# Patient Record
Sex: MALE | Race: BLACK OR AFRICAN AMERICAN
[De-identification: names, ages, dates, MRNs, and addresses within clinical notes are randomized per-mention and may not be internally consistent; named-entity substitution may affect disease eponyms.]

---

## 2017-02-14 ENCOUNTER — HOSPITAL ENCOUNTER (EMERGENCY)
Dept: HOSPITAL 62 - ER | Age: 57
Discharge: HOME | End: 2017-02-14
Payer: MEDICAID

## 2017-02-14 VITALS — SYSTOLIC BLOOD PRESSURE: 141 MMHG | DIASTOLIC BLOOD PRESSURE: 93 MMHG

## 2017-02-14 DIAGNOSIS — R11.2: Primary | ICD-10-CM

## 2017-02-14 DIAGNOSIS — R07.9: ICD-10-CM

## 2017-02-14 DIAGNOSIS — I10: ICD-10-CM

## 2017-02-14 DIAGNOSIS — R10.30: ICD-10-CM

## 2017-02-14 DIAGNOSIS — F17.200: ICD-10-CM

## 2017-02-14 DIAGNOSIS — R19.7: ICD-10-CM

## 2017-02-14 DIAGNOSIS — R05: ICD-10-CM

## 2017-02-14 DIAGNOSIS — R79.89: ICD-10-CM

## 2017-02-14 LAB
ALBUMIN SERPL-MCNC: 3.4 G/DL (ref 3.5–5)
ALP SERPL-CCNC: 94 U/L (ref 38–126)
ALT SERPL-CCNC: 121 U/L (ref 21–72)
ANION GAP SERPL CALC-SCNC: 8 MMOL/L (ref 5–19)
AST SERPL-CCNC: 99 U/L (ref 17–59)
BASOPHILS # BLD AUTO: 0 10^3/UL (ref 0–0.2)
BASOPHILS NFR BLD AUTO: 0.3 % (ref 0–2)
BILIRUB DIRECT SERPL-MCNC: 0 MG/DL (ref 0–0.3)
BILIRUB SERPL-MCNC: 0.8 MG/DL (ref 0.2–1.3)
BUN SERPL-MCNC: 15 MG/DL (ref 7–20)
CALCIUM: 9.3 MG/DL (ref 8.4–10.2)
CHLORIDE SERPL-SCNC: 104 MMOL/L (ref 98–107)
CK MB SERPL-MCNC: 8.41 NG/ML (ref ?–4.55)
CK SERPL-CCNC: 855 U/L (ref 55–170)
CO2 SERPL-SCNC: 28 MMOL/L (ref 22–30)
CREAT SERPL-MCNC: 0.81 MG/DL (ref 0.52–1.25)
EOSINOPHIL # BLD AUTO: 0 10^3/UL (ref 0–0.6)
EOSINOPHIL NFR BLD AUTO: 0.9 % (ref 0–6)
ERYTHROCYTE [DISTWIDTH] IN BLOOD BY AUTOMATED COUNT: 15.5 % (ref 11.5–14)
GLUCOSE SERPL-MCNC: 81 MG/DL (ref 75–110)
HCT VFR BLD CALC: 41.4 % (ref 37.9–51)
HGB BLD-MCNC: 13.8 G/DL (ref 13.5–17)
HGB HCT DIFFERENCE: 0
LIPASE SERPL-CCNC: 436.8 U/L (ref 23–300)
LYMPHOCYTES # BLD AUTO: 0.8 10^3/UL (ref 0.5–4.7)
LYMPHOCYTES NFR BLD AUTO: 22.1 % (ref 13–45)
MCH RBC QN AUTO: 29.4 PG (ref 27–33.4)
MCHC RBC AUTO-ENTMCNC: 33.3 G/DL (ref 32–36)
MCV RBC AUTO: 88 FL (ref 80–97)
MONOCYTES # BLD AUTO: 0.6 10^3/UL (ref 0.1–1.4)
MONOCYTES NFR BLD AUTO: 14.9 % (ref 3–13)
NEUTROPHILS # BLD AUTO: 2.4 10^3/UL (ref 1.7–8.2)
NEUTS SEG NFR BLD AUTO: 61.8 % (ref 42–78)
POTASSIUM SERPL-SCNC: 4.2 MMOL/L (ref 3.6–5)
PROT SERPL-MCNC: 7.4 G/DL (ref 6.3–8.2)
RBC # BLD AUTO: 4.69 10^6/UL (ref 4.35–5.55)
SODIUM SERPL-SCNC: 140.2 MMOL/L (ref 137–145)
TROPONIN I SERPL-MCNC: < 0.012 NG/ML
WBC # BLD AUTO: 3.8 10^3/UL (ref 4–10.5)

## 2017-02-14 PROCEDURE — 94640 AIRWAY INHALATION TREATMENT: CPT

## 2017-02-14 PROCEDURE — 85025 COMPLETE CBC W/AUTO DIFF WBC: CPT

## 2017-02-14 PROCEDURE — 82553 CREATINE MB FRACTION: CPT

## 2017-02-14 PROCEDURE — 87804 INFLUENZA ASSAY W/OPTIC: CPT

## 2017-02-14 PROCEDURE — 93005 ELECTROCARDIOGRAM TRACING: CPT

## 2017-02-14 PROCEDURE — 96361 HYDRATE IV INFUSION ADD-ON: CPT

## 2017-02-14 PROCEDURE — 76700 US EXAM ABDOM COMPLETE: CPT

## 2017-02-14 PROCEDURE — 36415 COLL VENOUS BLD VENIPUNCTURE: CPT

## 2017-02-14 PROCEDURE — 99285 EMERGENCY DEPT VISIT HI MDM: CPT

## 2017-02-14 PROCEDURE — 96374 THER/PROPH/DIAG INJ IV PUSH: CPT

## 2017-02-14 PROCEDURE — 83690 ASSAY OF LIPASE: CPT

## 2017-02-14 PROCEDURE — 93010 ELECTROCARDIOGRAM REPORT: CPT

## 2017-02-14 PROCEDURE — 82550 ASSAY OF CK (CPK): CPT

## 2017-02-14 PROCEDURE — 74022 RADEX COMPL AQT ABD SERIES: CPT

## 2017-02-14 PROCEDURE — 80053 COMPREHEN METABOLIC PANEL: CPT

## 2017-02-14 PROCEDURE — 84484 ASSAY OF TROPONIN QUANT: CPT

## 2017-02-14 NOTE — ER DOCUMENT REPORT
ED General





- General


Chief Complaint: Productive Cough


Stated Complaint: CHEST PAIN


Time seen by provider: 11:44


Mode of Arrival: Medic


Information source: Patient


Notes: 


56-year-old male who complains of 3 week history of intermittent nausea 

vomiting and diarrhea along with 4 day history of nonproductive cough 

subjective fever and sharp right parasternal chest pain that occurs in 

association with nausea goes away after about 5 minutes.  He reports chest 

discomfort is nonradiating.  He denies any type of chest tightness or 

heaviness.  He denies hematemesis, hematochezia, or melena.  He denies dysuria.

  He reports intermittent bilateral lower abdominal cramping which is not 

bothering him at the moment.  He denies earache, sore throat, back pain, or 

syncope.


Physical Exam:





General: Alert, appears well. 





HEENT: Normocephalic. Atraumatic. PERRLA. Extraocular movements intact. 

Oropharynx clear.





Neck: Supple. Non-tender.





Respiratory: No respiratory distress. Clear and equal breath sounds bilaterally.





Cardiovascular: Regular rate and rhythm. 





Abdominal: Normal Inspection. Soft, non-tender. No distension. Normal Bowel 

Sounds. 





Back: Non-tender. No deformity or step off.





All extremities warm with 2+ pulses no cyanosis no edema no Homans sign





Speech clear mentation normal  strength 5 out of 5 equal both upper 

extremities


Psychological: Normal affect. Normal Mood. 





Skin: Warm. Dry. Normal color.





Past Medical History





- Social History


Smoking Status: Current Every Day Smoker


Family History: None





- Past Medical History


Cardiac Medical History: Reports: Hx Hypertension


Neurological Medical History: Reports: Hx Cerebrovascular Accident - Residual 

left lower extremity weakness


Past Surgical History: Reports: Other - Back surgery





Review of Systems





- Review of Systems


Constitutional: See HPI


EENT: denies: Ear pain, Throat pain


Cardiovascular: See HPI


Respiratory: See HPI


Gastrointestinal: See HPI


Genitourinary: See HPI


Musculoskeletal: denies: Back pain


Skin: denies: Rash


Neurological/Psychological: denies: Speech impairment, Numbness





Physical Exam





- Vital signs


Vitals: 


 











Pulse Ox


 


 95 


 


 02/14/17 11:29














Course





- Re-evaluation


Re-evalutation: 


02/14/17 17:15


Patient has mild elevation of LFTs and lipase.  Patient has no discrete 

tenderness abdomen and no guarding rebound or rigidity reexamination.  I 

believe his symptoms are most consistent with a gastritis and I doubt patient 

has any pancreatitis sufficient to warrant admission or nothing by mouth 

status.  He'll be discharged with proton pump inhibitor, Carafate, and 

medication for nausea and instructed to follow with his physician [facility] next week for recheck.  Patient's presentation is not consistent with a 

cardiac etiology for his chest discomfort





02/14/17 18:01








- Vital Signs


Vital signs: 


 











Temp Pulse Resp BP Pulse Ox


 


             95 


 


             02/14/17 11:29








02/14/17 17:15








02/14/17 18:01








- Laboratory


Result Diagrams: 


 02/14/17 12:10





 02/14/17 12:10


Laboratory results interpreted by me: 


 











  02/14/17 02/14/17 02/14/17





  12:10 12:10 12:10


 


WBC  3.8 L  


 


RDW  15.5 H  


 


Plt Count  104 L  


 


Monocytes %  14.9 H  


 


AST   99 H 


 


ALT   121 H 


 


Creatine Kinase   855 H 


 


CK-MB (CK-2)    8.41 H


 


Albumin   3.4 L 


 


Lipase   436.8 H 














- Diagnostic Test


Radiology reviewed: Image reviewed, Reports reviewed





- EKG Interpretation by Me


Additional EKG results interpreted by me: 


02/14/17 11:47


EKG reviewed by myself shows sinus rhythm at 56 no acute changes





02/14/17 18:00


Repeat EKG reviewed, so signs rhythm 54 and changes





Discharge





- Discharge


Clinical Impression: 


Abdominal pain


Qualifiers:


 Abdominal location: unspecified location Qualified Code(s): R10.9 - 

Unspecified abdominal pain





Nausea & vomiting


Qualifiers:


 Vomiting type: unspecified Vomiting Intractability: non-intractable Qualified 

Code(s): R11.2 - Nausea with vomiting, unspecified





Condition: Stable


Disposition: HOME, SELF-CARE


Instructions:  Abdominal Pain ([facility])


Additional Instructions: 


Nausea or Vomiting, Nonspecific





     Vomiting (or nausea without vomiting) can be caused by many different 

problems. Of course, it can mean that something's wrong with the stomach, such 

as "stomach flu," ulcers, or inflammation. But it can also be a symptom of a 

problem that has nothing to do with the stomach or intestines. Vomiting is 

common with severe headaches, earaches, and tonsillitis. We see it with 

pneumonia or heart attacks. Drugs can cause nausea. Many abdominal problems 

cause vomiting; for example, gallstones, kidney stones, pancreatitis, and 

intestinal obstruction (blocked bowels).


     In most cases, curing the vomiting depends on fixing the problem that 

caused it. For temporary relief, we may use an anti-nausea medicine. For home 

use, we can prescribe suppositories, chewable pills, pills that dissolve in the 

mouth, or liquid anti-nausea drugs. If the vomiting seems to be caused by a 

problem in the stomach, acid-suppressing drugs may be prescribed as well.


     It's important to avoid dehydration. Sip clear liquids. Take increasing 

amounts of fluid over the first 24 hours. Then start small amounts of bland 

foods (such as dry toast, applesauce, mashed potato). Avoid aspirin, tobacco, 

and alcohol. Gradually resume your usual diet.


     If the vomiting worsens, if the problem that's making you vomit worsens, 

or if there's evidence of bleeding in the stomach (such as black, tarry stool, 

bloody or black vomit, or lightheadedness), you should return immediately. Call 

your doctor if you aren't improved in 24 to 36 hours.


Take Prevacid or Prilosec twice a day


Prescriptions: 


Promethazine HCl [Phenergan 25 mg Tablet] 1 tab PO Q6H PRN #20 tablet


 PRN Reason: 


Sucralfate [Carafate Susp 1 Gm/10 Ml Udcup] 1 gm PO QID #20 udc


Referrals: 


[facility] [Provider Group] - Follow up in 1 week

## 2017-12-06 ENCOUNTER — HOSPITAL ENCOUNTER (EMERGENCY)
Dept: HOSPITAL 62 - ER | Age: 57
Discharge: HOME | End: 2017-12-06
Payer: MEDICAID

## 2017-12-06 VITALS — DIASTOLIC BLOOD PRESSURE: 82 MMHG | SYSTOLIC BLOOD PRESSURE: 127 MMHG

## 2017-12-06 DIAGNOSIS — R50.9: ICD-10-CM

## 2017-12-06 DIAGNOSIS — I69.944: ICD-10-CM

## 2017-12-06 DIAGNOSIS — J18.1: Primary | ICD-10-CM

## 2017-12-06 DIAGNOSIS — I10: ICD-10-CM

## 2017-12-06 DIAGNOSIS — F17.210: ICD-10-CM

## 2017-12-06 LAB
ALBUMIN SERPL-MCNC: 3.8 G/DL (ref 3.5–5)
ALP SERPL-CCNC: 108 U/L (ref 38–126)
ALT SERPL-CCNC: 37 U/L (ref 21–72)
ANION GAP SERPL CALC-SCNC: 12 MMOL/L (ref 5–19)
APPEARANCE UR: CLEAR
AST SERPL-CCNC: 39 U/L (ref 17–59)
BASOPHILS # BLD AUTO: 0 10^3/UL (ref 0–0.2)
BASOPHILS NFR BLD AUTO: 0.2 % (ref 0–2)
BILIRUB DIRECT SERPL-MCNC: 0.6 MG/DL (ref 0–0.4)
BILIRUB SERPL-MCNC: 1 MG/DL (ref 0.2–1.3)
BILIRUB UR QL STRIP: NEGATIVE
BUN SERPL-MCNC: 7 MG/DL (ref 7–20)
CALCIUM: 9.4 MG/DL (ref 8.4–10.2)
CHLORIDE SERPL-SCNC: 92 MMOL/L (ref 98–107)
CO2 SERPL-SCNC: 28 MMOL/L (ref 22–30)
CREAT SERPL-MCNC: 0.74 MG/DL (ref 0.52–1.25)
EOSINOPHIL # BLD AUTO: 0 10^3/UL (ref 0–0.6)
EOSINOPHIL NFR BLD AUTO: 0 % (ref 0–6)
ERYTHROCYTE [DISTWIDTH] IN BLOOD BY AUTOMATED COUNT: 14.1 % (ref 11.5–14)
GLUCOSE SERPL-MCNC: 98 MG/DL (ref 75–110)
GLUCOSE UR STRIP-MCNC: NEGATIVE MG/DL
HCT VFR BLD CALC: 39.6 % (ref 37.9–51)
HGB BLD-MCNC: 13.3 G/DL (ref 13.5–17)
HGB HCT DIFFERENCE: 0.3
KETONES UR STRIP-MCNC: NEGATIVE MG/DL
LYMPHOCYTES # BLD AUTO: 1.6 10^3/UL (ref 0.5–4.7)
LYMPHOCYTES NFR BLD AUTO: 8.3 % (ref 13–45)
MAGNESIUM SERPL-MCNC: 1.8 MG/DL (ref 1.6–2.3)
MCH RBC QN AUTO: 30.6 PG (ref 27–33.4)
MCHC RBC AUTO-ENTMCNC: 33.6 G/DL (ref 32–36)
MCV RBC AUTO: 91 FL (ref 80–97)
MONOCYTES # BLD AUTO: 2.8 10^3/UL (ref 0.1–1.4)
MONOCYTES NFR BLD AUTO: 14.3 % (ref 3–13)
NEUTROPHILS # BLD AUTO: 15.1 10^3/UL (ref 1.7–8.2)
NEUTS SEG NFR BLD AUTO: 77.2 % (ref 42–78)
NITRITE UR QL STRIP: NEGATIVE
PH UR STRIP: 5 [PH] (ref 5–9)
POTASSIUM SERPL-SCNC: 3.1 MMOL/L (ref 3.6–5)
PROT SERPL-MCNC: 7.6 G/DL (ref 6.3–8.2)
PROT UR STRIP-MCNC: NEGATIVE MG/DL
RBC # BLD AUTO: 4.35 10^6/UL (ref 4.35–5.55)
SODIUM SERPL-SCNC: 132.2 MMOL/L (ref 137–145)
SP GR UR STRIP: 1.01
UROBILINOGEN UR-MCNC: 4 MG/DL (ref ?–2)
WBC # BLD AUTO: 19.6 10^3/UL (ref 4–10.5)

## 2017-12-06 PROCEDURE — 87205 SMEAR GRAM STAIN: CPT

## 2017-12-06 PROCEDURE — 87077 CULTURE AEROBIC IDENTIFY: CPT

## 2017-12-06 PROCEDURE — 99285 EMERGENCY DEPT VISIT HI MDM: CPT

## 2017-12-06 PROCEDURE — 87070 CULTURE OTHR SPECIMN AEROBIC: CPT

## 2017-12-06 PROCEDURE — 81001 URINALYSIS AUTO W/SCOPE: CPT

## 2017-12-06 PROCEDURE — 71020: CPT

## 2017-12-06 PROCEDURE — 80053 COMPREHEN METABOLIC PANEL: CPT

## 2017-12-06 PROCEDURE — 85025 COMPLETE CBC W/AUTO DIFF WBC: CPT

## 2017-12-06 PROCEDURE — 96368 THER/DIAG CONCURRENT INF: CPT

## 2017-12-06 PROCEDURE — 87804 INFLUENZA ASSAY W/OPTIC: CPT

## 2017-12-06 PROCEDURE — 87040 BLOOD CULTURE FOR BACTERIA: CPT

## 2017-12-06 PROCEDURE — 36415 COLL VENOUS BLD VENIPUNCTURE: CPT

## 2017-12-06 PROCEDURE — 83735 ASSAY OF MAGNESIUM: CPT

## 2017-12-06 PROCEDURE — 96365 THER/PROPH/DIAG IV INF INIT: CPT

## 2017-12-06 NOTE — RADIOLOGY REPORT (SQ)
EXAM DESCRIPTION:  CHEST PA/LAT



COMPLETED DATE/TIME:  12/6/2017 6:23 pm



REASON FOR STUDY:  Short of breath, productive cough, dyspnea on exer



COMPARISON:  4/30/2011



EXAM PARAMETERS:  NUMBER OF VIEWS: two views

TECHNIQUE: Digital Frontal and Lateral radiographic views of the chest acquired.

RADIATION DOSE: NA

LIMITATIONS: none



FINDINGS:  LUNGS AND PLEURA: Limited infiltrate is present in the right middle lobe.  This is best se
en on the lateral view.

MEDIASTINUM AND HILAR STRUCTURES: No masses or contour abnormalities.

HEART AND VASCULAR STRUCTURES: Heart normal size.  No evidence for failure.

BONES: No acute findings.

HARDWARE: None in the chest.

OTHER: No other significant finding.



IMPRESSION:  Right middle lobe pneumonia.



TECHNICAL DOCUMENTATION:  JOB ID:  [id number]

 2011 [employer]- All Rights Reserved

## 2017-12-06 NOTE — ER DOCUMENT REPORT
ED Medical Screen (RME)





- General


Chief Complaint: Shortness Of Breath


Stated Complaint: SHORTNESS OF BREATH, FEVER


Time Seen by Provider: 12/06/17 17:54


Notes: 





57-year-old pack-a-day smoker with a 5-6 day history of milky white productive 

cough, fever, shortness of breath made much worse with exertion.  Did not get a 

flu shot this year.





I have greeted and performed a rapid initial assessment of this patient.  A 

comprehensive ED assessment and evaluation of the patient, analysis of test 

results and completion of the medical decision making process will be conducted 

by additional ED providers.


TRAVEL OUTSIDE OF THE [place] IN LAST 30 DAYS: No





- Related Data


Allergies/Adverse Reactions: 


 





No Known Allergies Allergy (Unverified 12/06/17 17:14)


 











Past Medical History





- Social History


Chew tobacco use (# tins/day): No


Frequency of alcohol use: Heavy


Drug Abuse: None





- Past Medical History


Cardiac Medical History: Reports: Hx Hypertension


Neurological Medical History: Reports: Hx Cerebrovascular Accident - Residual 

left lower extremity weakness


Renal/ Medical History: Denies: Hx Peritoneal Dialysis


GI Medical History: Reports: Hx Gastroesophageal Reflux Disease


Musculoskeltal Medical History: Reports Hx Arthritis - back, hip and legs


Past Surgical History: Reports: Other - Back surgery





Physical Exam





- Vital signs


Vitals: 





 











Temp Pulse Resp BP Pulse Ox


 


 99.0 F   100   20   110/72   96 


 


 12/06/17 17:29  12/06/17 17:29  12/06/17 17:29  12/06/17 17:29  12/06/17 17:29














Course





- Vital Signs


Vital signs: 





 











Temp Pulse Resp BP Pulse Ox


 


 99.0 F   100   20   110/72   96 


 


 12/06/17 17:29  12/06/17 17:29  12/06/17 17:29  12/06/17 17:29  12/06/17 17:29

## 2017-12-06 NOTE — ER DOCUMENT REPORT
ED Respiratory Problem





- General


Chief Complaint: Shortness Of Breath


Stated Complaint: SHORTNESS OF BREATH, FEVER


Time Seen by Provider: 12/06/17 17:54


Notes: 


The patient is a 57-year-old male who presents with productive cough, shortness 

of breath and subjective fevers for the past 5 days.  He did not get his flu 

shot this year.  Patient denies chest pain, rash, leg swelling, hemoptysis


TRAVEL OUTSIDE OF THE [place] IN LAST 30 DAYS: No





- Related Data


Allergies/Adverse Reactions: 


 





No Known Allergies Allergy (Unverified 12/06/17 17:14)


 











Past Medical History





- General


Information source: Patient





- Social History


Smoking Status: Current Every Day Smoker


Chew tobacco use (# tins/day): No


Frequency of alcohol use: Heavy


Drug Abuse: None


Family History: None


Patient has suicidal ideation: No


Patient has homicidal ideation: No





- Past Medical History


Cardiac Medical History: Reports: Hx Hypertension


Neurological Medical History: Reports: Hx Cerebrovascular Accident - Residual 

left lower extremity weakness


Renal/ Medical History: Denies: Hx Peritoneal Dialysis


GI Medical History: Reports: Hx Gastroesophageal Reflux Disease


Musculoskeltal Medical History: Reports Hx Arthritis - back, hip and legs


Past Surgical History: Reports: Other - Back surgery





Review of Systems





- Review of Systems


Notes: 


REVIEW OF SYSTEMS:


CONSTITUTIONAL: +fevers, +chills


EENT: -eye pain, -difficulty swallowing, -nasal congestion


CARDIOVASCULAR: -chest pain, -syncope.


RESPIRATORY: +cough, +SOB


GASTROINTESTINAL: -abdominal pain, - nausea, -vomiting, -diarrhea


GENITOURINARY: -dysuria, -hematuria


MUSCULOSKELETAL: -back pain, -neck pain


SKIN: -rash or skin lesions.


HEMATOLOGIC: -easy bruising or bleeding.


LYMPHATIC: -swollen, enlarged glands.


NEUROLOGICAL: -altered mental status or loss of consciousness, -headache, -

neurologic symptoms


PSYCHIATRIC: -anxiety, -depression.


ALL OTHER SYSTEMS REVIEWED AND NEGATIVE.





Physical Exam





- Vital signs


Vitals: 


 











Temp Pulse Resp BP Pulse Ox


 


 99.0 F   100   20   110/72   96 


 


 12/06/17 17:29  12/06/17 17:29  12/06/17 17:29  12/06/17 17:29  12/06/17 17:29














- Notes


Notes: 


PHYSICAL EXAMINATION:





GENERAL: Well-appearing, well-nourished and in no acute distress.





HEAD: Atraumatic, normocephalic.





EYES: Pupils equal round and reactive to light, extraocular movements intact, 

sclera anicteric, conjunctiva are normal.





ENT: nares patent, oropharynx clear without exudates.  Moist mucous membranes.





NECK: Normal range of motion, supple without lymphadenopathy





LUNGS: Crackles in right middle lung field. No respiratory distress.





HEART: Regular rate and rhythm without murmurs





ABDOMEN: Soft, nontender, normoactive bowel sounds.  No guarding, no rebound.  

No masses appreciated.





EXTREMITIES: Normal range of motion, no pitting or edema.  No cyanosis.





NEUROLOGICAL: Cranial nerves grossly intact.  Normal speech, normal gait.  

Normal sensory and motor exams.





PSYCH: Normal mood, normal affect.





SKIN: Warm, Dry, normal turgor, no rashes or lesions noted.





Course





- Re-evaluation


Re-evalutation: 


Patient with clinical and radiographic evidence of a right middle lobe 

pneumonia.  His curb-65 score is 0, so he is safe for outpatient treatment.  

Patient provided a dose of antibiotics while in the emergency room.  Instructed 

him to stop smoking and to take his full course of antibiotics.  Also 

instructed him to obtain a flu shot once he is feeling better.





- Vital Signs


Vital signs: 


 











Temp Pulse Resp BP Pulse Ox


 


 99.0 F   100   20   110/72   96 


 


 12/06/17 17:29  12/06/17 17:29  12/06/17 17:29  12/06/17 17:29  12/06/17 17:29














- Laboratory


Result Diagrams: 


 12/06/17 18:07





 12/06/17 19:12


Laboratory results interpreted by me: 


 











  12/06/17 12/06/17





  18:07 19:12


 


WBC  19.6 H 


 


Hgb  13.3 L 


 


RDW  14.1 H 


 


Lymphocytes %  8.3 L 


 


Monocytes %  14.3 H 


 


Absolute Neutrophils  15.1 H 


 


Absolute Monocytes  2.8 H 


 


Sodium   132.2 L


 


Potassium   3.1 L


 


Chloride   92 L


 


Direct Bilirubin   0.6 H














- Diagnostic Test


Radiology reviewed: Image reviewed, Reports reviewed


Radiology results interpreted by me: 


CXR: Right middle lobe infiltrate.





Discharge





- Discharge


Clinical Impression: 


Pneumonia


Qualifiers:


 Pneumonia type: due to unspecified organism Laterality: right Lung location: 

middle lobe of lung Qualified Code(s): J18.1 - Lobar pneumonia, unspecified 

organism





Condition: Stable


Disposition: HOME, SELF-CARE


Additional Instructions: 


PNEUMONIA:





     Your examination indicates that you have pneumonia.  This is an infection 

of the lung tissue, usually caused by bacteria or a virus. Symptoms include 

cough, fever, shaking chills, chest pain, shortness of breath, and coughing up 

bloody sputum.


     Treatment for bacterial pneumonia includes rest, antibiotics for 10 to 14 

days, increasing your clear liquid intake, a cool mist humidifier at your 

bedside, and fever medication.  Often, a repeat chest X-ray is performed in a 

few weeks--even if you feel better--to ascertain whether the infection has 

completely resolved and no underlying lung problem is present.


     You should call the physician if you develop persistent vomiting, high 

fever that does not respond to fever medication, increasing shortness of breath

, confusion, or lethargy.  Also, failure to improve within two to three days is 

an indication for re-examination.








ANTIBIOTIC INJECTION:


     You have been given an antibiotic injection.  Sometimes the injection must 

be combined with antibiotic pills.  For some infections, the shot provides all 

the antibiotic that's needed.


     Common side effects of antibiotics include nausea, intestinal cramping, or 

diarrhea.  These are very unusual following a shot.


     Women may develop vaginal yeast infections, and babies can get yeast (

thrush) in the mouth following the use of antibiotics.  Contact your physician 

if you develop significant side effects from this medication.


     Allergy to this antibiotic can result in hives, wheezing, faintness, or 

itching.  If symptoms of allergy occur, call the doctor at once.








ROCEPHIN:


     You have been given an injection of an antibiotic called Rocephin (

ceftriaxone).  Sometimes the injection must be combined with antibiotic pills.  

For some infections, such as an uncomplicated ear infection, Rocephin provides 

all the antibiotic that's needed.


     The antibiotic will be in your body for about two days.  For serious 

infections, we usually repeat doses of Rocephin daily.


     Side effects are very unusual following a shot.  Women may develop vaginal 

yeast infections, and babies can get yeast (thrush) in the mouth following the 

use of antibiotics.  Contact your physician if you have symptoms with this 

medication.


     Allergy to this antibiotic can result in hives, wheezing, faintness, or 

itching.  If symptoms of allergy occur, call the doctor at once.








ANTIBIOTIC THERAPY:


     You have been given an antibiotic prescription.  It's important that you 

take all the medication, unless instructed otherwise by your physician.  

Failure to complete the entire course can result in relapse of your condition.


     Common side effects of antibiotics include nausea, intestinal cramping, or 

diarrhea.  Women may develop vaginal yeast infections, and babies can get yeast 

(thrush) in the mouth following the use of antibiotics.  Contact your physician 

if you develop significant side effects from this medication.


     Allergy to this antibiotic can result in hives, wheezing, faintness, or 

itching.  If symptoms of allergy occur, stop the medication and call the doctor.








AZITHROMYCIN:


     Azithromycin (Zithromax) is a broad spectrum antibiotic in the same class 

as erythromycin.  It can treat a variety of bacterial infections, but is most 

frequently used for respiratory infections.


     Azithromycin is extremely long-lasting.  It accumulates in body tissues 

and continues to kill bacteria for many days.


     In order to improve absorption, Azithromycin should be taken at least one 

hour before or two hours after a meal.  It does not have the same strong 

tendency to upset the stomach as erythromycin and is usually very well 

tolerated.


     Patients who have had a rash or other true allergic reactions to 

erythromycin should not take this medication.  Call if you develop 

gastrointestinal distress, severe diarrhea, rash, hives, itching, or shortness 

of breath.








USE OF ACETAMINOPHEN (Tylenol):


     Acetaminophen may be taken for pain relief or fever control. It's much 

safer than aspirin, offering a wider range of "safe" dosages.  It is safe 

during pregnancy.  Some brand names are Tylenol, Panadol, Datril, Anacin 3, 

Tempra, and Liquiprin. Acetaminophen can be repeated every four hours.  The 

following are maximum recommended dosages:





WEIGHT         Dose             Drops                  Elixir        Chewable(

80mg)


(LBS.)                            drprs=droppers    tsp=teaspoon


6               40 mg            0.4 ml (1/2)


6-11            80 mg            0.8 ml (full)              tsp               

   1       tab


12-16         120 mg           1 1/2 drprs             3/4  tsp               1 

1/2  tabs


17-23         160 mg             2  drprs             1    tsp                 

  2       tabs


24-30         240 mg             3  drprs             1 1/2 tsp                

3       tabs


30-35         320 mg                                       2    tsp            

       4       tabs


36-41         360 mg                                       2 1/4   tsp         

     4 1/2 tabs


42-47         400 mg                                       2 1/2   tsp         

     5      tabs


48-53         480 mg                                       3    tsp            

       6      tabs


54-59         520 mg                                       3  1/4  tsp         

     6 1/2 tabs


60-64         560 mg                                       3  1/2  tsp         

     7      tabs 


65-70         600 mg                                       3  3/4  tsp         

     7 1/2 tabs


71-76         640 mg                                       4   tsp             

      8      tabs


77-82         720 mg                                       4 1/2   tsp         

    9      tabs


83-88         800 mg                                       5   tsp             

    10      tabs





>89 pounds or adults          650 mg to 900 mg





Acetaminophen can be repeated every four hours.  Maximum dose not to exceed 

4000 mg a day.





   These maximum recommended dosages are slightly higher than the dosages 

written on the product container, but these dosages are very safe and below the 

toxic dosage for acetaminophen.








FOLLOW-UP CARE:


If you have been referred to a physician for follow-up care, call the physician

s office for an appointment as you were instructed or within the next two days.

  If you experience worsening or a significant change in your symptoms, notify 

the physician immediately or return to the Emergency Department at any time for 

re-evaluation.


Prescriptions: 


Azithromycin 250 mg PO DAILY #4 tablet


Forms:  Smoking Cessation Education


Referrals: 


JUDIE CANNON MD [[facility]] - Follow up as needed

## 2018-06-02 ENCOUNTER — HOSPITAL ENCOUNTER (EMERGENCY)
Dept: HOSPITAL 62 - ER | Age: 58
Discharge: HOME | End: 2018-06-02
Payer: MEDICAID

## 2018-06-02 VITALS — SYSTOLIC BLOOD PRESSURE: 134 MMHG | DIASTOLIC BLOOD PRESSURE: 82 MMHG

## 2018-06-02 DIAGNOSIS — I10: ICD-10-CM

## 2018-06-02 DIAGNOSIS — K29.21: Primary | ICD-10-CM

## 2018-06-02 DIAGNOSIS — F17.200: ICD-10-CM

## 2018-06-02 LAB
ADD MANUAL DIFF: NO
ALBUMIN SERPL-MCNC: 4.3 G/DL (ref 3.5–5)
ALP SERPL-CCNC: 90 U/L (ref 38–126)
ALT SERPL-CCNC: 74 U/L (ref 21–72)
ANION GAP SERPL CALC-SCNC: 12 MMOL/L (ref 5–19)
AST SERPL-CCNC: 55 U/L (ref 17–59)
BASOPHILS # BLD AUTO: 0 10^3/UL (ref 0–0.2)
BASOPHILS NFR BLD AUTO: 0.3 % (ref 0–2)
BILIRUB DIRECT SERPL-MCNC: 0.6 MG/DL (ref 0–0.4)
BILIRUB SERPL-MCNC: 1.1 MG/DL (ref 0.2–1.3)
BUN SERPL-MCNC: 12 MG/DL (ref 7–20)
CALCIUM: 10.1 MG/DL (ref 8.4–10.2)
CHLORIDE SERPL-SCNC: 98 MMOL/L (ref 98–107)
CO2 SERPL-SCNC: 27 MMOL/L (ref 22–30)
EOSINOPHIL # BLD AUTO: 0 10^3/UL (ref 0–0.6)
EOSINOPHIL NFR BLD AUTO: 0.2 % (ref 0–6)
ERYTHROCYTE [DISTWIDTH] IN BLOOD BY AUTOMATED COUNT: 15 % (ref 11.5–14)
GLUCOSE SERPL-MCNC: 86 MG/DL (ref 75–110)
HCT VFR BLD CALC: 38.9 % (ref 37.9–51)
HGB BLD-MCNC: 13.3 G/DL (ref 13.5–17)
LYMPHOCYTES # BLD AUTO: 1.5 10^3/UL (ref 0.5–4.7)
LYMPHOCYTES NFR BLD AUTO: 15.8 % (ref 13–45)
MCH RBC QN AUTO: 30.5 PG (ref 27–33.4)
MCHC RBC AUTO-ENTMCNC: 34.3 G/DL (ref 32–36)
MCV RBC AUTO: 89 FL (ref 80–97)
MONOCYTES # BLD AUTO: 1.9 10^3/UL (ref 0.1–1.4)
MONOCYTES NFR BLD AUTO: 19.6 % (ref 3–13)
NEUTROPHILS # BLD AUTO: 6.1 10^3/UL (ref 1.7–8.2)
NEUTS SEG NFR BLD AUTO: 64.1 % (ref 42–78)
PLATELET # BLD: 286 10^3/UL (ref 150–450)
POTASSIUM SERPL-SCNC: 4.2 MMOL/L (ref 3.6–5)
PROT SERPL-MCNC: 8.5 G/DL (ref 6.3–8.2)
RBC # BLD AUTO: 4.36 10^6/UL (ref 4.35–5.55)
SODIUM SERPL-SCNC: 137.4 MMOL/L (ref 137–145)
TOTAL CELLS COUNTED % (AUTO): 100 %
WBC # BLD AUTO: 9.6 10^3/UL (ref 4–10.5)

## 2018-06-02 PROCEDURE — 80053 COMPREHEN METABOLIC PANEL: CPT

## 2018-06-02 PROCEDURE — 86900 BLOOD TYPING SEROLOGIC ABO: CPT

## 2018-06-02 PROCEDURE — 96374 THER/PROPH/DIAG INJ IV PUSH: CPT

## 2018-06-02 PROCEDURE — S0164 INJECTION PANTROPRAZOLE: HCPCS

## 2018-06-02 PROCEDURE — 86850 RBC ANTIBODY SCREEN: CPT

## 2018-06-02 PROCEDURE — 85025 COMPLETE CBC W/AUTO DIFF WBC: CPT

## 2018-06-02 PROCEDURE — 36415 COLL VENOUS BLD VENIPUNCTURE: CPT

## 2018-06-02 PROCEDURE — 86901 BLOOD TYPING SEROLOGIC RH(D): CPT

## 2018-06-02 PROCEDURE — 96361 HYDRATE IV INFUSION ADD-ON: CPT

## 2018-06-02 PROCEDURE — 99284 EMERGENCY DEPT VISIT MOD MDM: CPT

## 2018-06-02 NOTE — ER DOCUMENT REPORT
ED General





- General


Chief Complaint: Nausea/Vomiting


Stated Complaint: NAUSEA,VOMITING BLOOD


Time Seen by Provider: 06/02/18 17:05


Mode of Arrival: Medic


Notes: 





Patient is a 57-year-old male with a history of alcoholic gastritis, ongoing 

alcohol use who presents with 2 episodes of vomiting today which there was 

approximately "a dime sized clot".  He states that he contacted EMS and was 

brought to the emergency department.  He denies ever having vomited blood in 

the past.  He denies any jacqueline hematemesis.  He has tolerated oral intake since 

that time without any difficulty and states he feels quite well at this time 

point.  He denies any abdominal pain.  He states nothing improves or worsens 

his symptoms.  He has not seen his primary doctor regarding today's concerns.  

He does not take any form of anti-coagulation.


TRAVEL OUTSIDE OF THE [place] IN LAST 30 DAYS: No





- Related Data


Allergies/Adverse Reactions: 


 





No Known Allergies Allergy (Verified 06/02/18 17:05)


 











Past Medical History





- General


Information source: Patient





- Social History


Smoking Status: Current Every Day Smoker


Chew tobacco use (# tins/day): No


Frequency of alcohol use: daily Beer


Drug Abuse: None


Lives with: Alone


Family History: Reviewed & Not Pertinent


Patient has suicidal ideation: No


Patient has homicidal ideation: No





- Past Medical History


Cardiac Medical History: Reports: Hx Hypertension


Neurological Medical History: Reports: Hx Cerebrovascular Accident - Residual 

left lower extremity weakness


Renal/ Medical History: Denies: Hx Peritoneal Dialysis


GI Medical History: Reports: Hx Gastroesophageal Reflux Disease


Musculoskeltal Medical History: Reports Hx Arthritis - back, hip and legs


Past Surgical History: Reports: Other - Back surgery





Review of Systems





- Review of Systems


Notes: 





Constitutional: Negative for fever.


HENT: Negative for sore throat.


Eyes: Negative for visual changes.


Cardiovascular: Negative for chest pain.


Respiratory: Negative for shortness of breath.


Gastrointestinal: Negative for abdominal pain, positive for vomiting


Genitourinary: Negative for dysuria.


Musculoskeletal: Negative for back pain.


Skin: Negative for rash.


Neurological: Negative for headaches, weakness or numbness.





10 point ROS negative except as marked above and in HPI.





Physical Exam





- Vital signs


Vitals: 


 











Temp Pulse BP Pulse Ox


 


 97.8 F   117 H  113/72   92 


 


 06/02/18 16:38  06/02/18 16:38  06/02/18 16:38  06/02/18 16:38











Interpretation: Tachycardic - Resolved at this time assessment


Notes: 





PHYSICAL EXAMINATION:





GENERAL: Well-appearing, well-nourished and in no acute distress.





HEAD: Atraumatic, normocephalic.





EYES: Pupils equal round and reactive to light, extraocular movements intact, 

sclera anicteric, conjunctiva are normal.





ENT: nares patent, oropharynx clear without exudates.  Moist mucous membranes.





NECK: Normal range of motion, supple without lymphadenopathy





LUNGS: Breath sounds clear to auscultation bilaterally and equal.  No wheezes 

rales or rhonchi.





HEART: Regular rate and rhythm without murmurs





ABDOMEN: Soft, nontender, normoactive bowel sounds.  No guarding, no rebound.  

No masses appreciated.





EXTREMITIES: Normal range of motion, no pitting or edema.  No cyanosis.





NEUROLOGICAL: No focal neurological deficits. Moves all extremities 

spontaneously and on command.





PSYCH: Normal mood, normal affect.





SKIN: Warm, Dry, normal turgor, no rashes or lesions noted.





Course





- Re-evaluation


Re-evalutation: 





06/02/18 19:05


Patient presents with 2 episodes of vomiting which both contain per the patient 

"about a dime sized clot".  CBC unremarkable with a hemoglobin of 13.3 

unchanged from December 2017 which was also 13.3.  He has not had any recurrent 

episodes of vomiting.  He denies any symptoms at the time of my assessment.  

Patient admits to heavy alcohol use and I discussed with him at length that 

this is the source of his vomiting and small amount of blood in the vomitus.  I 

have encouraged him to wean down as fast as possible on his use of alcohol and 

have given him instructions on how to do this.  I have also encouraged him to 

look into inpatient rehabilitation resources and have provided him a list of 

these resources.  Patient has no focal abdominal tenderness on examination.    

No LFT changes.  Based on history and exam, I do not suspect ACS, pulmonary 

embolus, SBO, mesenteric ischemia, acute pancreatitis, biliary pathology, or an 

abdominal aortic dissection.  I do not believe the patient has an emergent 

upper GI bleed that would require hospitalization or emergent endoscopy given 

his normal hemoglobin, reassuring vitals (initial tachycardia on presentation 

was completely resolved at the time of my assessment without intervention) and 

patient's characterization of a very minimal amount of blood being vomited.  He 

has not had any blood in his stool, denies any melanotic stools.  Patient will 

be started on famotidine 40 mg twice daily as well as Carafate.  At this time 

will discharge with return precautions and follow-up recommendations.  Verbal 

discharge instructions given a the bedside and opportunity for questions given. 

Medication warnings reviewed. Patient is in agreement with this plan and has 

verbalized understanding of return precautions and the need for primary care 

follow-up in the next 24-72 hours.








- Vital Signs


Vital signs: 


 











Temp Pulse Resp BP Pulse Ox


 


 99.0 F   99   18   134/82 H  94 


 


 06/02/18 19:54  06/02/18 19:54  06/02/18 19:54  06/02/18 19:54  06/02/18 19:54














- Laboratory


Result Diagrams: 


 06/02/18 17:42





 06/02/18 17:42


Laboratory results interpreted by me: 


 











  06/02/18 06/02/18





  17:42 17:42


 


Hgb  13.3 L 


 


RDW  15.0 H 


 


Monocytes %  19.6 H 


 


Absolute Monocytes  1.9 H 


 


Direct Bilirubin   0.6 H


 


ALT   74 H


 


Total Protein   8.5 H














Discharge





- Discharge


Clinical Impression: 


Alcoholic gastritis with bleeding


Qualifiers:


 Chronicity: acute Qualified Code(s): K29.21 - Alcoholic gastritis with bleeding





Nausea and vomiting


Qualifiers:


 Vomiting type: unspecified Vomiting Intractability: non-intractable Qualified 

Code(s): R11.2 - Nausea with vomiting, unspecified





Condition: Good


Disposition: HOME, SELF-CARE


Additional Instructions: 


Your symptoms appear to be most consistent with stomach or upper intestinal 

irritation.  As we discussed this is directly related to her alcohol use and 

you need to wean down her alcohol use as quickly as you are able.  I encourage 

you to follow-up with inpatient resources for detox.  Please begin taking 

famotidine 40 mg in the morning and 40 mg at night.  This medicine can be 

purchased directly over-the-counter.  You may also take medicine such as Pepto-

Bismol or Tums to assist with your pain.  Please return to emergency department 

immediately if you have additional episodes of vomiting with blood, shortness 

of breath, you become unable to exert yourself due to pain or difficulty 

breathing, you pass out, or have any pain that radiates into your arms, jaw, or 

back. Please also return if you have any additional symptoms that are 

concerning to you.





As we have discussed, the most important thing is lifestyle changes.  You need 

to avoid smoking, sodas, tea, coffee, alcohol, spicy foods, and acidic foods 

such as citrus fruits, tomato based products, berries, and most fruit juices. 


Prescriptions: 


Famotidine 40 mg PO BID #60 tablet


Sucralfate [Carafate 1 gm Tablet] 1 gm PO ACHS #120 tablet

## 2018-06-02 NOTE — ER DOCUMENT REPORT
ED Medical Screen (RME)





- General


Chief Complaint: Nausea/Vomiting


Stated Complaint: NAUSEA,VOMITING BLOOD


Time Seen by Provider: 06/02/18 17:05


Mode of Arrival: Medic


Information source: Patient


Notes: 





57-year-old male who states he drinks multiple cans of beer a day presents with 

complaints of 2 episodes where he vomited blood clots.  Patient notes no 

previous similar episodes denies any fevers or chills


Patient has no abdominal pain





I have greeted and performed a rapid initial assessment of this patient.  A 

comprehensive ED assessment and evaluation of the patient, analysis of test 

results and completion of the medical decision making process will be conducted 

by additional ED providers.





PHYSICAL EXAMINATION:





GENERAL: Well-appearing, well-nourished and in no acute distress.





HEAD: Atraumatic, normocephalic.





EYES: Pupils equal round extraocular movements intact,  conjunctiva are normal.





ENT: Nares patent





NECK: Normal range of motion





LUNGS: No respiratory distress





Musculoskeletal: Normal range of motion





NEUROLOGICAL:  Normal speech, normal gait. 





PSYCH: Normal mood, normal affect.





SKIN: Warm, Dry, normal turgor, no rashes or lesions noted.


TRAVEL OUTSIDE OF THE [place] IN LAST 30 DAYS: No





- Related Data


Allergies/Adverse Reactions: 


 





No Known Allergies Allergy (Verified 06/02/18 17:05)


 











Past Medical History





- Social History


Chew tobacco use (# tins/day): No


Frequency of alcohol use: daily Beer


Drug Abuse: None





- Past Medical History


Cardiac Medical History: Reports: Hx Hypertension


Neurological Medical History: Reports: Hx Cerebrovascular Accident - Residual 

left lower extremity weakness


Renal/ Medical History: Denies: Hx Peritoneal Dialysis


GI Medical History: Reports: Hx Gastroesophageal Reflux Disease


Musculoskeltal Medical History: Reports Hx Arthritis - back, hip and legs


Past Surgical History: Reports: Other - Back surgery





Physical Exam





- Vital signs


Vitals: 





 











Temp Pulse BP Pulse Ox


 


 97.8 F   117 H  113/72   92 


 


 06/02/18 16:38  06/02/18 16:38  06/02/18 16:38  06/02/18 16:38














Course





- Vital Signs


Vital signs: 





 











Temp Pulse Resp BP Pulse Ox


 


 97.8 F   117 H     113/72   92 


 


 06/02/18 16:38  06/02/18 16:38     06/02/18 16:38  06/02/18 16:38